# Patient Record
Sex: MALE | Race: WHITE | NOT HISPANIC OR LATINO | URBAN - METROPOLITAN AREA
[De-identification: names, ages, dates, MRNs, and addresses within clinical notes are randomized per-mention and may not be internally consistent; named-entity substitution may affect disease eponyms.]

---

## 2017-10-13 ENCOUNTER — EMERGENCY (EMERGENCY)
Facility: HOSPITAL | Age: 47
LOS: 1 days | Discharge: HOME | End: 2017-10-13
Attending: EMERGENCY MEDICINE | Admitting: EMERGENCY MEDICINE
Payer: COMMERCIAL

## 2017-10-13 VITALS
HEART RATE: 104 BPM | TEMPERATURE: 97 F | SYSTOLIC BLOOD PRESSURE: 153 MMHG | DIASTOLIC BLOOD PRESSURE: 107 MMHG | HEIGHT: 70 IN | WEIGHT: 205.03 LBS | RESPIRATION RATE: 16 BRPM | OXYGEN SATURATION: 96 %

## 2017-10-13 VITALS
DIASTOLIC BLOOD PRESSURE: 84 MMHG | SYSTOLIC BLOOD PRESSURE: 138 MMHG | OXYGEN SATURATION: 98 % | TEMPERATURE: 97 F | HEART RATE: 87 BPM | RESPIRATION RATE: 18 BRPM

## 2017-10-13 DIAGNOSIS — I10 ESSENTIAL (PRIMARY) HYPERTENSION: ICD-10-CM

## 2017-10-13 DIAGNOSIS — E78.5 HYPERLIPIDEMIA, UNSPECIFIED: ICD-10-CM

## 2017-10-13 DIAGNOSIS — R07.89 OTHER CHEST PAIN: ICD-10-CM

## 2017-10-13 DIAGNOSIS — R20.2 PARESTHESIA OF SKIN: ICD-10-CM

## 2017-10-13 DIAGNOSIS — B20 HUMAN IMMUNODEFICIENCY VIRUS [HIV] DISEASE: ICD-10-CM

## 2017-10-13 DIAGNOSIS — G47.26 CIRCADIAN RHYTHM SLEEP DISORDER, SHIFT WORK TYPE: ICD-10-CM

## 2017-10-13 LAB
ALBUMIN SERPL ELPH-MCNC: 5.1 G/DL — HIGH (ref 3.3–5)
ALP SERPL-CCNC: 106 U/L — SIGNIFICANT CHANGE UP (ref 40–120)
ALT FLD-CCNC: 41 U/L — SIGNIFICANT CHANGE UP (ref 10–45)
ANION GAP SERPL CALC-SCNC: 16 MMOL/L — SIGNIFICANT CHANGE UP (ref 5–17)
APTT BLD: 34.2 SEC — SIGNIFICANT CHANGE UP (ref 27.5–37.4)
AST SERPL-CCNC: 27 U/L — SIGNIFICANT CHANGE UP (ref 10–40)
BASOPHILS NFR BLD AUTO: 0.4 % — SIGNIFICANT CHANGE UP (ref 0–2)
BILIRUB SERPL-MCNC: 0.4 MG/DL — SIGNIFICANT CHANGE UP (ref 0.2–1.2)
BUN SERPL-MCNC: 14 MG/DL — SIGNIFICANT CHANGE UP (ref 7–23)
CALCIUM SERPL-MCNC: 10.4 MG/DL — SIGNIFICANT CHANGE UP (ref 8.4–10.5)
CHLORIDE SERPL-SCNC: 99 MMOL/L — SIGNIFICANT CHANGE UP (ref 96–108)
CK MB CFR SERPL CALC: 1.6 NG/ML — SIGNIFICANT CHANGE UP (ref 0–6.7)
CO2 SERPL-SCNC: 24 MMOL/L — SIGNIFICANT CHANGE UP (ref 22–31)
CREAT SERPL-MCNC: 1.07 MG/DL — SIGNIFICANT CHANGE UP (ref 0.5–1.3)
EOSINOPHIL NFR BLD AUTO: 1.1 % — SIGNIFICANT CHANGE UP (ref 0–6)
GLUCOSE SERPL-MCNC: 123 MG/DL — HIGH (ref 70–99)
HCT VFR BLD CALC: 39.6 % — SIGNIFICANT CHANGE UP (ref 39–50)
HGB BLD-MCNC: 14.3 G/DL — SIGNIFICANT CHANGE UP (ref 13–17)
INR BLD: 0.96 — SIGNIFICANT CHANGE UP (ref 0.88–1.16)
LYMPHOCYTES # BLD AUTO: 31.4 % — SIGNIFICANT CHANGE UP (ref 13–44)
MCHC RBC-ENTMCNC: 31 PG — SIGNIFICANT CHANGE UP (ref 27–34)
MCHC RBC-ENTMCNC: 36.1 G/DL — HIGH (ref 32–36)
MCV RBC AUTO: 85.9 FL — SIGNIFICANT CHANGE UP (ref 80–100)
MONOCYTES NFR BLD AUTO: 8.3 % — SIGNIFICANT CHANGE UP (ref 2–14)
NEUTROPHILS NFR BLD AUTO: 58.8 % — SIGNIFICANT CHANGE UP (ref 43–77)
NT-PROBNP SERPL-SCNC: 15 PG/ML — SIGNIFICANT CHANGE UP (ref 0–300)
PLATELET # BLD AUTO: 207 K/UL — SIGNIFICANT CHANGE UP (ref 150–400)
POTASSIUM SERPL-MCNC: 4.1 MMOL/L — SIGNIFICANT CHANGE UP (ref 3.5–5.3)
POTASSIUM SERPL-SCNC: 4.1 MMOL/L — SIGNIFICANT CHANGE UP (ref 3.5–5.3)
PROT SERPL-MCNC: 8.5 G/DL — HIGH (ref 6–8.3)
PROTHROM AB SERPL-ACNC: 10.7 SEC — SIGNIFICANT CHANGE UP (ref 9.8–12.7)
RBC # BLD: 4.61 M/UL — SIGNIFICANT CHANGE UP (ref 4.2–5.8)
RBC # FLD: 12.3 % — SIGNIFICANT CHANGE UP (ref 10.3–16.9)
SODIUM SERPL-SCNC: 139 MMOL/L — SIGNIFICANT CHANGE UP (ref 135–145)
TROPONIN T SERPL-MCNC: <0.01 NG/ML — SIGNIFICANT CHANGE UP (ref 0–0.01)
WBC # BLD: 5.6 K/UL — SIGNIFICANT CHANGE UP (ref 3.8–10.5)
WBC # FLD AUTO: 5.6 K/UL — SIGNIFICANT CHANGE UP (ref 3.8–10.5)

## 2017-10-13 PROCEDURE — 99284 EMERGENCY DEPT VISIT MOD MDM: CPT | Mod: 25

## 2017-10-13 PROCEDURE — 93005 ELECTROCARDIOGRAM TRACING: CPT

## 2017-10-13 PROCEDURE — 80053 COMPREHEN METABOLIC PANEL: CPT

## 2017-10-13 PROCEDURE — 71020: CPT | Mod: 26

## 2017-10-13 PROCEDURE — 85025 COMPLETE CBC W/AUTO DIFF WBC: CPT

## 2017-10-13 PROCEDURE — 85610 PROTHROMBIN TIME: CPT

## 2017-10-13 PROCEDURE — 83880 ASSAY OF NATRIURETIC PEPTIDE: CPT

## 2017-10-13 PROCEDURE — 71046 X-RAY EXAM CHEST 2 VIEWS: CPT

## 2017-10-13 PROCEDURE — 93010 ELECTROCARDIOGRAM REPORT: CPT

## 2017-10-13 PROCEDURE — 82553 CREATINE MB FRACTION: CPT

## 2017-10-13 PROCEDURE — 85730 THROMBOPLASTIN TIME PARTIAL: CPT

## 2017-10-13 PROCEDURE — 36415 COLL VENOUS BLD VENIPUNCTURE: CPT

## 2017-10-13 PROCEDURE — 82550 ASSAY OF CK (CPK): CPT

## 2017-10-13 PROCEDURE — 99285 EMERGENCY DEPT VISIT HI MDM: CPT | Mod: 25

## 2017-10-13 PROCEDURE — 84484 ASSAY OF TROPONIN QUANT: CPT

## 2017-10-13 RX ORDER — ASPIRIN/CALCIUM CARB/MAGNESIUM 324 MG
325 TABLET ORAL ONCE
Qty: 0 | Refills: 0 | Status: COMPLETED | OUTPATIENT
Start: 2017-10-13 | End: 2017-10-13

## 2017-10-13 RX ADMIN — Medication 325 MILLIGRAM(S): at 18:22

## 2017-10-13 NOTE — PROGRESS NOTE ADULT - SUBJECTIVE AND OBJECTIVE BOX
PUD CCM ATTENDING    46 year old  TV  called my office today. He has left chest pain, since Tuesday night.  He moved furniture, book shell, bed and rug on Monday. Today it is more sore than before.  He has cold sensation of the left finger tips.     PAST MEDICAL & SURGICAL HISTORY:  HIV   HTN  chol    Mx  metoprolol  atorvastatin    FAMILY HISTORY:  fa HTN CABG  mo HTN hyperthyroid    SOCIAL HISTORY:  lives with partner; no smoking; beer social    REVIEW OF SYSTEMS:  Constitutional: (-) weight change, (-) fever,  (-) chills, () fatigue, (-) night sweats  Eyes: (-) discharge, () eye pain, () visual change  ENT:  (-) hearing difficulty, () vertigo, () sinus pain,  () throat pain, () epistaxis, () dysphagia, () hoarseness  Neck: () pain, (-) stiffness, () swelling  Respiratory: (-) cough, (-) wheezing, -() hemoptysis      Cardiovascular: () chest pain, (-)palpitations, (-) dizziness   Gastrointestinal: (-) abdominal pain, (-) nausea, -() vomiting, () hematemesis, -() diarrhea,  -() constipation, () melena  Genitourinary:  (-) dysuria, () frequency, (-) hematuria, () incontinence      Neurologic: (-) headache, (-) memory loss, (-) loss of strength, () numbness, () tremor     Skin: () itching, () burning, () rash, () lesions   Lymphatic: () enlarged lymph nodes  Endocrine: (-) hair loss, (-) temperature intolerance         Musculoskeletal: (-) back pain, (-) joint pain,  (-) extremity pain  Psychiatric: () visual change, () auditory change, () depression, () anxiety, () suicidal  Sleep: (-) disorder, (-) insomnia, (-) sleep deprivation  Heme/Lymph: () easy bruising, () bleeding gums            Allergy and Immunologic: () hives, () eczema    Vital Signs Last 24 Hrs  T(C): 35.9 (13 Oct 2017 17:11), Max: 35.9 (13 Oct 2017 17:11)  T(F): 96.6 (13 Oct 2017 17:11), Max: 96.6 (13 Oct 2017 17:11)  HR: 104 (13 Oct 2017 17:11) (104 - 104)  BP: 153/107 (13 Oct 2017 17:11) (153/107 - 153/107)  BP(mean): --  RR: 16 (13 Oct 2017 17:11) (16 - 16)  SpO2: 96% (13 Oct 2017 17:11) (96% - 96%)    I&O's Detail    PHYSICAL EXAM:  on stretcher  Well nourished, well developed, comfortable, - acute distress; vital signs are monitored  Eyes: PERRLA, EOMI, -conjunctivitis, -scleritis   Head: no focal deficit, normocephalic,  no trauma  ENMT: moist tongue, no thrush, -nasal discharge, -hoarseness, normal hearing, -cough, -hemoptysis, trachea midline  Neck: supple, - lymphadenopathy,  -masses, -JVD  Respiratory: bilateral diminished breath sounds, -wheezing, -rhonchi, -rales, -crackles  Chest: -accessory muscle use, -paradoxical breathing  Cardiovascular: regular rate and sinus rhythm, S1 S2 normal, -S3, -S4, -murmur, -gallop, -rub  Gastrointestinal: soft, nontender, distended, normal bowel sounds, no hepatosplenomegaly  Genitourinary: -flank pain, -dysuria  Extremities: -clubbing, -cyanosis, -edema    Vascular: peripheral pulses palpable 2+ bilaterally  Neurological: alert, oriented x 3, no focal deficit, -tremor   Skin: warm, dry, -erythema, iv site intact  Lymph nodes; no cervical, supraclavicular or axillary adenopathy  Psychiatric: cooperative, appropriate mood    MEDICATIONS  (STANDING):  aspirin 325 milliGRAM(s) Oral Once    MEDICATIONS  (PRN):    Allergies    No Known Allergies    Intolerances    LABS:  pending, will review    +DVT prophylaxis N/A  +Sleep SHIFT WORK DISORDER  +Nutrition goals ORAL  -Pain 3/10 and DISCOMFORT  -Decubital ulcer  +GI prophylaxis (PPV, coagulopathy, Hx)  +Aspiration prophylaxis (45 degrees)  +Sedation/analgesia stopped once  +ID (phos, CH, mupi, SB)  DOES NOT WANT TO TALK, STIGMA  -Delirium  +Cardiac Beta/ASA/statin  +Prevention  +Education  +Medication reviewed (drug-drug interactions, PDA)  Medical devices  Discussed with ER MD, ER RN,    RADIOLOGY & ADDITIONAL STUDIES:    CXR reviewed myself: normal.

## 2017-10-13 NOTE — ED ADULT NURSE REASSESSMENT NOTE - NS ED NURSE REASSESS COMMENT FT1
Patient a/oX 3, anxious, denies any chest pain at this time, no headache, dizziness or nausea, no neuro deficits or weakness, vital signs stable.  Right AC PIV #20 in place, all labs sent, tolerated well. Xray done.  STable and comfortable.  Results and disposition pending.

## 2017-10-13 NOTE — ED PROVIDER NOTE - OBJECTIVE STATEMENT
45 y/o M w/HTN, HLD, non-smoker, no known cardiac disease, p/w L sided chest pressure non-radiating since last night, unchanged throughout the day. Non-positional, no SOB or palpitations though pt states that he took his pulse and it was fast (>100). No cough, fever/chills or recent illness. No n/v or abd pain. Denies LE pain/swelling. No immobility/surgery/travel. Attempted to contact PMD today but was unable to speak with him so presented to ED for eval. 45 y/o M w/HIV on HAART viral load undetectable, HTN, HLD, non-smoker, no known cardiac disease, p/w L sided chest pressure non-radiating since last night, unchanged throughout the day. Non-positional, no SOB or palpitations though pt states that he took his pulse and it was fast (>100). No cough, fever/chills or recent illness. No n/v or abd pain. Denies LE pain/swelling. No immobility/surgery/travel. Attempted to contact PMD today but was unable to speak with him so presented to ED for eval.

## 2017-10-13 NOTE — ED ADULT NURSE NOTE - CHPI ED SYMPTOMS NEG
no pain/no vomiting/no dizziness/no decreased eating/drinking/no chills/no numbness/no tingling/no nausea/no fever/no weakness

## 2017-10-13 NOTE — PROGRESS NOTE ADULT - PROBLEM SELECTOR PROBLEM 2
Patient is a 81y old  Female who presents with a chief complaint of worsening shortness of breath (05 Oct 2017 16:50)        HPI:  This is an 80 y.o. 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 She states it started one week prior when she had non bloody diarrhea for two days. THe dyspnea has progressively getting worse since then. Patient uses 3 L NC O2 at home. She tried using Duoneb x3 at home but it did not help, nor did the 3L of O2. Patient denies any fever, chills,  n/v, palpitations, diaphoresis. She also admits to increased swelling of both her feet and increased lethargy today.    In the ED, vitals were Temp: 99.8F   /124 RR 24  SpO2: 86% RA. Sig labs include WBC 7.8, INR 4.10, PTT 40.1, PT 46, lactate 3.6, glucose 111, albumin 3.1,procalc <0.05, trop 0.192, pro BNP 1269. ABG showed pH 7.48, pCO2 33, HCO3 26. UA showed trace leuk est, neg nitrate. CXR ordered. EKG ordered. Patient put on BiPap. Gave solu Medrol 125 mg IV one dose, metoprolol 5 mg IV, 1 L IVF NS bolus, Duonebs x1. (04 Oct 2017 23:52)      SUBJECTIVE & OBJECTIVE: Pt seen and examined at bedside.     PHYSICAL EXAM:  T(C): 36.6 (10-07-17 @ 08:00), Max: 36.8 (10-06-17 @ 12:00)  HR: 70 (10-07-17 @ 11:07) (64 - 110)  BP: 166/76 (10-07-17 @ 08:00) (119/70 - 166/76)  RR: 17 (10-07-17 @ 08:00) (17 - 18)  SpO2: 95% (10-07-17 @ 08:00) (91% - 96%)  Wt(kg): --   GENERAL: NAD, well-groomed, well-developed  HEAD:  Atraumatic, Normocephalic  EYES: EOMI, PERRLA, conjunctiva and sclera clear  ENMT: Moist mucous membranes  NECK: Supple, No JVD  NERVOUS SYSTEM:  Alert & Oriented X3, Motor Strength 5/5 B/L upper and lower extremities; DTRs 2+ intact and symmetric  CHEST/LUNG: Clear to auscultation bilaterally; No rales, rhonchi, wheezing, or rubs  HEART: Regular rate and rhythm; No murmurs, rubs, or gallops  ABDOMEN: Soft, Nontender, Nondistended; Bowel sounds present  EXTREMITIES:  2+ Peripheral Pulses, No clubbing, cyanosis, or edema        MEDICATIONS  (STANDING):  ALBUTerol/ipratropium for Nebulization 3 milliLiter(s) Nebulizer four times a day  amLODIPine   Tablet 5 milliGRAM(s) Oral daily  aspirin enteric coated 81 milliGRAM(s) Oral daily  atorvastatin 20 milliGRAM(s) Oral at bedtime  buDESOnide   0.5 milliGRAM(s) Respule 0.5 milliGRAM(s) Inhalation two times a day  dextrose 5%. 1000 milliLiter(s) (50 mL/Hr) IV Continuous <Continuous>  dextrose 50% Injectable 12.5 Gram(s) IV Push once  furosemide   Injectable 40 milliGRAM(s) IV Push daily  insulin glargine Injectable (LANTUS) 30 Unit(s) SubCutaneous at bedtime  insulin lispro (HumaLOG) corrective regimen sliding scale   SubCutaneous three times a day before meals  levothyroxine 50 MICROGram(s) Oral daily  methylPREDNISolone sodium succinate Injectable 40 milliGRAM(s) IV Push daily  metoprolol 50 milliGRAM(s) Oral two times a day  pantoprazole    Tablet 40 milliGRAM(s) Oral before breakfast    MEDICATIONS  (PRN):  acetaminophen   Tablet 650 milliGRAM(s) Oral every 6 hours PRN For Temp greater than 38 C (100.4 F)  ALBUTerol/ipratropium for Nebulization 3 milliLiter(s) Nebulizer every 8 hours PRN Respiratory Distress  dextrose Gel 1 Dose(s) Oral once PRN Blood Glucose LESS THAN 70 milliGRAM(s)/deciliter  glucagon  Injectable 1 milliGRAM(s) IntraMuscular once PRN Glucose LESS THAN 70 milligrams/deciliter  ondansetron Injectable 4 milliGRAM(s) IV Push every 6 hours PRN Nausea      LABS:                        11.1   12.2  )-----------( 228      ( 07 Oct 2017 06:23 )             35.2     10-07    141  |  104  |  30<H>  ----------------------------<  213<H>  4.9   |  29  |  1.30    Ca    8.7      07 Oct 2017 06:23  Phos  2.9     10-06  Mg     2.1     10-06    TPro  6.5  /  Alb  2.8<L>  /  TBili  0.3  /  DBili  x   /  AST  8<L>  /  ALT  11<L>  /  AlkPhos  67  10-06    PT/INR - ( 07 Oct 2017 06:23 )   PT: 38.1 sec;   INR: 3.41 ratio               CAPILLARY BLOOD GLUCOSE  291 (07 Oct 2017 11:22)  272 (06 Oct 2017 21:19)  280 (06 Oct 2017 16:41)          CAPILLARY BLOOD GLUCOSE  291 (07 Oct 2017 11:22)  272 (06 Oct 2017 21:19)  280 (06 Oct 2017 16:41)        CAPILLARY BLOOD GLUCOSE  291 (07 Oct 2017 11:22)          CARDIAC MARKERS ( 06 Oct 2017 06:52 )  .119 ng/mL / x     / 56 U/L / x     / x            RECENT CULTURES:      RADIOLOGY & ADDITIONAL TESTS:                        DVT/GI ppx  Discussed with pt @ bedside Paresthesia

## 2017-10-13 NOTE — ED PROVIDER NOTE - PHYSICAL EXAMINATION
VITAL SIGNS: I have reviewed nursing notes and confirm.  CONSTITUTIONAL: Well-developed; well-nourished man comfortable in stretcher though nervous appearing, speaking clearly in complete sentences A&Ox3; in no acute distress.  SKIN: Agree with RN documentation regarding decubitus evaluation. Remainder of skin exam is warm and dry, no acute rash.  HEAD: Normocephalic; atraumatic.  EYES: PERRL, EOM intact; conjunctiva and sclera clear.  ENT: No nasal discharge; airway clear.  NECK: Supple; non tender.  CARD: S1, S2 normal; no murmurs, gallops, or rubs. RRR w/rate in 80s  RESP: No wheezes, rales or rhonchi. CTA b/l w/good excursion, no inc wob  ABD: Normal bowel sounds; soft; non-distended; non-tender  EXT: Normal ROM. No clubbing, cyanosis or edema. non-ttp, no erythema or asymmetry to LE's b/l  LYMPH: No acute cervical adenopathy.  NEURO: Alert, oriented. Grossly unremarkable. gait intact.  PSYCH: Cooperative, appropriate.

## 2017-10-13 NOTE — ED ADULT NURSE NOTE - OTHER COMPLAINTS
Pt presented to ED with intermittent left side chest pain.  Pt denies SOB, dizziness, N+V, cough, fever. Hx of HTN, high cholesterol. EKG in progress.

## 2017-10-13 NOTE — ED ADULT NURSE REASSESSMENT NOTE - NS ED NURSE REASSESS COMMENT FT1
Patient a/oX 3, no chest pain or SOB, states feeling better, vital signs stable. Discharge instruction reviewed w/ patient and verbalized understanding. Dishcarged to home in stable condition.

## 2017-10-13 NOTE — ED PROVIDER NOTE - MEDICAL DECISION MAKING DETAILS
Negative CXR, NSR on EKG w/no ischemic changes, PERC negative, labs wnl, negative trop, safe to d/c home with PMD f/u for atypical chest pain, evaluated by PMD Dr. Johnson in ED as well, who agrees with plan. Given return precautions and anticipatory guidance.
